# Patient Record
Sex: FEMALE | Race: BLACK OR AFRICAN AMERICAN | NOT HISPANIC OR LATINO | URBAN - METROPOLITAN AREA
[De-identification: names, ages, dates, MRNs, and addresses within clinical notes are randomized per-mention and may not be internally consistent; named-entity substitution may affect disease eponyms.]

---

## 2021-07-20 ENCOUNTER — WEB ENCOUNTER (OUTPATIENT)
Dept: URBAN - METROPOLITAN AREA CLINIC 40 | Facility: CLINIC | Age: 26
End: 2021-07-20

## 2021-07-20 ENCOUNTER — OFFICE VISIT (OUTPATIENT)
Dept: URBAN - METROPOLITAN AREA CLINIC 40 | Facility: CLINIC | Age: 26
End: 2021-07-20
Payer: OTHER GOVERNMENT

## 2021-07-20 ENCOUNTER — LAB OUTSIDE AN ENCOUNTER (OUTPATIENT)
Dept: URBAN - METROPOLITAN AREA CLINIC 40 | Facility: CLINIC | Age: 26
End: 2021-07-20

## 2021-07-20 DIAGNOSIS — R19.4 CHANGE IN BOWEL HABITS: ICD-10-CM

## 2021-07-20 DIAGNOSIS — R10.33 PERIUMBILICAL PAIN: ICD-10-CM

## 2021-07-20 DIAGNOSIS — K21.9 GERD: ICD-10-CM

## 2021-07-20 PROCEDURE — 99203 OFFICE O/P NEW LOW 30 MIN: CPT | Performed by: INTERNAL MEDICINE

## 2021-07-20 RX ORDER — PANTOPRAZOLE SODIUM 40 MG/1
1 TABLET TABLET, DELAYED RELEASE ORAL ONCE A DAY
Qty: 90 | Refills: 0 | OUTPATIENT
Start: 2021-07-20

## 2021-07-20 NOTE — PHYSICAL EXAM GASTROINTESTINAL
Abdomen , soft, mild diffuse tenderness, nondistended , no guarding or rigidity , no masses palpable , normal bowel sounds , Liver and Spleen , no hepatomegaly present , no hepatosplenomegaly , liver nontender , spleen not palpable

## 2021-07-20 NOTE — HPI-TODAY'S VISIT:
Ms. Glynn is a 25-year-old black female presenting as a new patient for evaluation of abdominal discomfort and constipation.  Patient states she has had issues since 2014.  She states that she will have episodic abdominal pain that is severe enough to the point where she cannot walk.  She has had medical evaluations on  bases where she has been at the time.  First evaluation was in 2014.  She states that she was evaluated with imaging and advised that she was severely constipated and needed to be flushed out.  She had repeat episodes in 2016 as well as 2018.  She admits to chronic constipation.  She go up to 2 days without a bowel movement.  Stools are hard.  There is no blood in the stool.  She denies any family history of colon cancer.  She never had a colonoscopy.  Over the past year she is also noted issues with heartburn as well as nausea.  She did have an evaluation by her primary doctor that sounds like the H. pylori breath test that was negative.  She feels like she drinks enough water daily.

## 2021-07-21 ENCOUNTER — OFFICE VISIT (OUTPATIENT)
Dept: URBAN - METROPOLITAN AREA CLINIC 40 | Facility: CLINIC | Age: 26
End: 2021-07-21

## 2021-09-10 ENCOUNTER — OFFICE VISIT (OUTPATIENT)
Dept: URBAN - METROPOLITAN AREA CLINIC 74 | Facility: CLINIC | Age: 26
End: 2021-09-10

## 2021-09-10 VITALS — HEIGHT: 63 IN

## 2021-09-10 RX ORDER — PANTOPRAZOLE SODIUM 40 MG/1
1 TABLET TABLET, DELAYED RELEASE ORAL ONCE A DAY
Qty: 90 | Refills: 0 | Status: ACTIVE | COMMUNITY
Start: 2021-07-20

## 2022-05-25 ENCOUNTER — OFFICE VISIT (OUTPATIENT)
Dept: URBAN - METROPOLITAN AREA CLINIC 40 | Facility: CLINIC | Age: 27
End: 2022-05-25
Payer: OTHER GOVERNMENT

## 2022-05-25 VITALS
HEIGHT: 63 IN | SYSTOLIC BLOOD PRESSURE: 116 MMHG | BODY MASS INDEX: 35.79 KG/M2 | WEIGHT: 202 LBS | DIASTOLIC BLOOD PRESSURE: 74 MMHG | HEART RATE: 80 BPM | TEMPERATURE: 98.4 F

## 2022-05-25 DIAGNOSIS — K59.09 CHRONIC CONSTIPATION: ICD-10-CM

## 2022-05-25 DIAGNOSIS — K21.9 GERD: ICD-10-CM

## 2022-05-25 DIAGNOSIS — R19.4 CHANGE IN BOWEL HABITS: ICD-10-CM

## 2022-05-25 DIAGNOSIS — R10.33 PERIUMBILICAL PAIN: ICD-10-CM

## 2022-05-25 PROCEDURE — 99213 OFFICE O/P EST LOW 20 MIN: CPT | Performed by: PHYSICIAN ASSISTANT

## 2022-05-25 RX ORDER — PANTOPRAZOLE SODIUM 40 MG/1
1 TABLET TABLET, DELAYED RELEASE ORAL ONCE A DAY
Qty: 90 | Refills: 0 | Status: DISCONTINUED | COMMUNITY
Start: 2021-07-20

## 2022-05-25 RX ORDER — PANTOPRAZOLE SODIUM 40 MG/1
1 TABLET TABLET, DELAYED RELEASE ORAL ONCE A DAY
Qty: 90 TABLET | Refills: 0 | OUTPATIENT

## 2022-05-25 NOTE — HPI-TODAY'S VISIT:
Ms. Glynn is a 26-year-old black female, last seen by Dr. Bell on 7/20/21, presenting as a new patient for evaluation of abdominal discomfort and constipation.  Patient states she has had issues since 2014.  She states that she will have episodic abdominal pain that is severe enough to the point where she cannot walk.  She has had medical evaluations on  bases where she has been at the time.  First evaluation was in 2014.  She states that she was evaluated with imaging and advised that she was severely constipated and needed to be flushed out.  She had repeat episodes in 2016 as well as 2018.  She admits to chronic constipation.  She go up to 2 days without a bowel movement.  Stools are hard.  There is no blood in the stool.  She denies any family history of colon cancer.  She never had a colonoscopy.  Over the past year she is also noted issues with heartburn as well as nausea.  She did have an evaluation by her primary doctor that sounds like the H. pylori breath test that was negative.  She feels like she drinks enough water daily. Normal CT A/P 12/29/21 ordered by Dr. Bell, with no acute GI findings. Some retained stool, small umbilical hernia. No acute findings. She returns to the office today with same complaint as last visit including occasional heartburn, no abdominal pain, dyspeptic symptoms and rare episodes of feeling like throat irritation when she swallows.  She is not on PPI as recommended by Dr. Bell stating that she felt better in regards her constipation after last visit and did not even take the MiraLAX.  She has been trying to eat healthier and drink more water and does remain active.  Practices yoga and is occasionally noting some prominence in the epigastric region when she does certain upside down pulses.  No vomiting, fever chills or dysphagia reported.  No significant weight loss, she is obese.  Reports last menstrual period normal for her.  No family history given of gastric or colonic malignancy.

## 2022-05-25 NOTE — PHYSICAL EXAM GASTROINTESTINAL
Abdomen Obese, soft, nontender, nondistended , no guarding or rigidity , no masses palpable , normal bowel sounds , Liver and Spleen,  no hepatosplenomegaly , liver nontender

## 2022-07-29 ENCOUNTER — CLAIMS CREATED FROM THE CLAIM WINDOW (OUTPATIENT)
Dept: URBAN - METROPOLITAN AREA TELEHEALTH 2 | Facility: TELEHEALTH | Age: 27
End: 2022-07-29
Payer: OTHER GOVERNMENT

## 2022-07-29 DIAGNOSIS — R10.33 PERIUMBILICAL PAIN: ICD-10-CM

## 2022-07-29 DIAGNOSIS — K59.09 CHRONIC CONSTIPATION: ICD-10-CM

## 2022-07-29 DIAGNOSIS — R19.4 CHANGE IN BOWEL HABITS: ICD-10-CM

## 2022-07-29 PROBLEM — 235595009 GASTROESOPHAGEAL REFLUX DISEASE: Status: ACTIVE | Noted: 2021-07-20

## 2022-07-29 PROCEDURE — 99213 OFFICE O/P EST LOW 20 MIN: CPT | Performed by: PHYSICIAN ASSISTANT

## 2022-07-29 PROCEDURE — 99213 OFFICE O/P EST LOW 20 MIN: CPT | Performed by: INTERNAL MEDICINE

## 2022-07-29 RX ORDER — PANTOPRAZOLE SODIUM 40 MG/1
1 TABLET TABLET, DELAYED RELEASE ORAL ONCE A DAY
Qty: 90 TABLET | Refills: 0 | Status: ON HOLD | COMMUNITY

## 2023-01-06 ENCOUNTER — OFFICE VISIT (OUTPATIENT)
Dept: URBAN - METROPOLITAN AREA TELEHEALTH 2 | Facility: TELEHEALTH | Age: 28
End: 2023-01-06

## 2023-01-13 ENCOUNTER — OFFICE VISIT (OUTPATIENT)
Dept: URBAN - METROPOLITAN AREA TELEHEALTH 2 | Facility: TELEHEALTH | Age: 28
End: 2023-01-13

## 2023-01-13 ENCOUNTER — DASHBOARD ENCOUNTERS (OUTPATIENT)
Age: 28
End: 2023-01-13

## 2023-01-13 RX ORDER — PANTOPRAZOLE SODIUM 40 MG/1
1 TABLET TABLET, DELAYED RELEASE ORAL ONCE A DAY
Qty: 90 TABLET | Refills: 0 | Status: ON HOLD | COMMUNITY

## 2023-01-13 NOTE — HPI-TODAY'S VISIT:
Ms. Glynn is a 27-year-old black female, last seen 5/25/22 for f/u of constipation.  Patient states she has had issues since 2014.  She was, at one point, having episodic abdominal pain that severe enough to the point where she could not walk.  She has had medical evaluations on  bases where she has been at the time.  First evaluation was in 2014.  She states that she was evaluated with imaging and advised that she was severely constipated and needed to be flushed out.  She had repeat episodes in 2016 as well as 2018.  She admits to chronic constipation.  She go up to 2 days without a bowel movement.  Stools are hard.  There is no blood in the stool.  She denies any family history of colon cancer.  She never had a colonoscopy.  Over the past year she is also noted issues with heartburn as well as nausea, now resolved, not requiring pantoprazole. Eating more home-cooked meals now and reflux improved.  She did have an evaluation by her primary doctor that sounds like the H. pylori breath test that was negative.  She feels like she drinks enough water daily. Normal CT A/P 12/29/21 ordered by Dr. Bell, with no acute GI findings. Some retained stool, small umbilical hernia. No acute findings. She was last seen with same complaint of occasional heartburn, no abdominal pain, dyspeptic symptoms and rare episodes of feeling like throat irritation when she swallows.  She has been trying to eat healthier and drink more water and does remain active. Takes a probiotic daily. Practices yoga.  No vomiting, fever chills or dysphagia reported.  No significant unintentional weight loss, she is obese.  No family history given of gastric or colonic malignancy.  She was recently diagnosed with PCOS. She will soon establish care with a new GYN provider. Taking flaxseed meal and increasing dietary fiber naturally, not wanting to take a prescribed medication daily for constipation. Thus, she stopped taking both the Miralax and Linzess. No rectal bleeding. No other complaints.

## 2025-05-27 ENCOUNTER — APPOINTMENT (OUTPATIENT)
Age: 30
Setting detail: DERMATOLOGY
End: 2025-05-27

## 2025-05-27 DIAGNOSIS — L82.1 OTHER SEBORRHEIC KERATOSIS: ICD-10-CM

## 2025-05-27 PROCEDURE — 99202 OFFICE O/P NEW SF 15 MIN: CPT

## 2025-05-27 PROCEDURE — ? ADDITIONAL NOTES

## 2025-05-27 PROCEDURE — ? COUNSELING

## 2025-05-27 ASSESSMENT — LOCATION SIMPLE DESCRIPTION DERM
LOCATION SIMPLE: RIGHT CHEEK
LOCATION SIMPLE: LEFT CHEEK

## 2025-05-27 ASSESSMENT — LOCATION DETAILED DESCRIPTION DERM
LOCATION DETAILED: LEFT CENTRAL MALAR CHEEK
LOCATION DETAILED: RIGHT MEDIAL MALAR CHEEK

## 2025-05-27 ASSESSMENT — LOCATION ZONE DERM: LOCATION ZONE: FACE
